# Patient Record
(demographics unavailable — no encounter records)

---

## 2025-01-21 NOTE — HISTORY OF PRESENT ILLNESS
[FreeTextEntry1] : Patient presents for skin examination. [de-identified] : Notes rough lesions on the scalp.  Increasing in number.

## 2025-01-21 NOTE — PHYSICAL EXAM
[Alert] : alert [Oriented x 3] : ~L oriented x 3 [Well Nourished] : well nourished [Full Body Skin Exam Performed] : performed [FreeTextEntry3] : A full skin exam was performed including the scalp, face (including lips, ears, nose and eyes), neck, chest, abdomen, back, buttocks, upper extremities and lower extremities.  The patient declined examination of the genitalia.   The exam revealed the following benign growths: Seborrheic keratoses. Cherry angioma.  keratotic papules, scalp x 18, left ear, left temple and right pre-auricular x 2.

## 2025-07-28 NOTE — PHYSICAL EXAM
[Alert] : alert [Oriented x 3] : ~L oriented x 3 [Well Nourished] : well nourished [Declined] : declined [FreeTextEntry3] : Keratotic papules, numerous, of the scalp.  Also on the left cheek and left nose.  papule of the right nose with telangiectasias.  SK's and lentigines, face, scalp, neck and UE's.

## 2025-07-28 NOTE — ASSESSMENT
[FreeTextEntry1] : SK's and lentigines Hats and sunscreens encouraged.  R/o BCC, right nose, vs. Seb hyperplasia Plan D&C if positive.  AK's, scalp. Discussed at length, including field tx options. Patient to return in the fall for PDT to the scalp.

## 2025-07-28 NOTE — HISTORY OF PRESENT ILLNESS
[FreeTextEntry1] : Patient for rough lesions of the scalp. [de-identified] : Increasing in number.  No improvement with moisturizers.